# Patient Record
Sex: FEMALE | Race: WHITE
[De-identification: names, ages, dates, MRNs, and addresses within clinical notes are randomized per-mention and may not be internally consistent; named-entity substitution may affect disease eponyms.]

---

## 2018-05-21 ENCOUNTER — HOSPITAL ENCOUNTER (OUTPATIENT)
Dept: HOSPITAL 76 - LAB.N | Age: 68
End: 2018-05-21
Attending: PHYSICIAN ASSISTANT
Payer: MEDICARE

## 2018-05-21 DIAGNOSIS — E11.9: Primary | ICD-10-CM

## 2018-05-21 LAB
EST. AVERAGE GLUCOSE BLD GHB EST-MCNC: 163 MG/DL (ref 70–100)
HB2 TOTAL: 12.9 G/DL
HBA1C BLD-MCNC: 0.72 G/DL
HEMOGLOBIN A1C %: 7.3 % (ref 4.6–6.2)

## 2018-05-21 PROCEDURE — 36415 COLL VENOUS BLD VENIPUNCTURE: CPT

## 2018-05-21 PROCEDURE — 83036 HEMOGLOBIN GLYCOSYLATED A1C: CPT

## 2018-06-27 ENCOUNTER — HOSPITAL ENCOUNTER (OUTPATIENT)
Dept: HOSPITAL 76 - RT.N | Age: 68
End: 2018-06-27
Attending: FAMILY MEDICINE
Payer: MEDICARE

## 2018-06-27 DIAGNOSIS — R42: Primary | ICD-10-CM

## 2018-06-27 PROCEDURE — 93005 ELECTROCARDIOGRAM TRACING: CPT

## 2018-06-28 ENCOUNTER — HOSPITAL ENCOUNTER (OUTPATIENT)
Dept: HOSPITAL 76 - LAB.N | Age: 68
Discharge: HOME | End: 2018-06-28
Attending: FAMILY MEDICINE
Payer: MEDICARE

## 2018-06-28 DIAGNOSIS — R42: ICD-10-CM

## 2018-06-28 DIAGNOSIS — D64.9: Primary | ICD-10-CM

## 2018-06-28 DIAGNOSIS — R94.31: ICD-10-CM

## 2018-06-28 LAB
ALBUMIN DIAFP-MCNC: 3.7 G/DL (ref 3.2–5.5)
ALBUMIN/GLOB SERPL: 0.9 {RATIO} (ref 1–2.2)
ALP SERPL-CCNC: 45 IU/L (ref 42–121)
ALT SERPL W P-5'-P-CCNC: 29 IU/L (ref 10–60)
ANION GAP SERPL CALCULATED.4IONS-SCNC: 9 MMOL/L (ref 6–13)
AST SERPL W P-5'-P-CCNC: 32 IU/L (ref 10–42)
BASOPHILS NFR BLD AUTO: 0 10^3/UL (ref 0–0.1)
BASOPHILS NFR BLD AUTO: 0.8 %
BILIRUB BLD-MCNC: 0.5 MG/DL (ref 0.2–1)
BUN SERPL-MCNC: 26 MG/DL (ref 6–20)
CALCIUM UR-MCNC: 9.3 MG/DL (ref 8.5–10.3)
CHLORIDE SERPL-SCNC: 106 MMOL/L (ref 101–111)
CO2 SERPL-SCNC: 22 MMOL/L (ref 21–32)
CREAT SERPLBLD-SCNC: 0.7 MG/DL (ref 0.4–1)
EOSINOPHIL # BLD AUTO: 0.1 10^3/UL (ref 0–0.7)
EOSINOPHIL NFR BLD AUTO: 2.3 %
ERYTHROCYTE [DISTWIDTH] IN BLOOD BY AUTOMATED COUNT: 15.2 % (ref 12–15)
FERRITIN SERPL-MCNC: 6.3 NG/ML (ref 11–306.8)
FOLATE SERPL-MCNC: > 49.6 NG/ML
GFRSERPLBLD MDRD-ARVRAT: 83 ML/MIN/{1.73_M2} (ref 89–?)
GLOBULIN SER-MCNC: 3.9 G/DL (ref 2.1–4.2)
GLUCOSE SERPL-MCNC: 189 MG/DL (ref 70–100)
HGB UR QL STRIP: 12.1 G/DL (ref 12–16)
IRON SATN MFR SERPL: 13 % (ref 20–50)
IRON SERPL-MCNC: 56 UG/DL (ref 28–170)
LYMPHOCYTES # SPEC AUTO: 2.1 10^3/UL (ref 1.5–3.5)
LYMPHOCYTES NFR BLD AUTO: 43.1 %
MCH RBC QN AUTO: 29 PG (ref 27–31)
MCHC RBC AUTO-ENTMCNC: 34.4 G/DL (ref 32–36)
MCV RBC AUTO: 84.3 FL (ref 81–99)
MEAN RETIC VALUE: 108.8
MONOCYTES # BLD AUTO: 0.3 10^3/UL (ref 0–1)
MONOCYTES NFR BLD AUTO: 5.7 %
NEUTROPHILS # BLD AUTO: 2.3 10^3/UL (ref 1.5–6.6)
NEUTROPHILS # SNV AUTO: 4.8 X10^3/UL (ref 4.8–10.8)
NEUTROPHILS NFR BLD AUTO: 48.1 %
PDW BLD AUTO: 7.7 FL (ref 7.9–10.8)
PLATELET # BLD: 230 10^3/UL (ref 130–450)
PROT SPEC-MCNC: 7.6 G/DL (ref 6.7–8.2)
RBC MAR: 4.18 10^6/UL (ref 4.2–5.4)
SODIUM SERPLBLD-SCNC: 137 MMOL/L (ref 135–145)
TIBC SERPL-MCNC: 426 UG/DL (ref 250–450)
TRANSFERRIN SERPL-MCNC: 304 MG/DL (ref 192–382)
VIT B12 SERPL-MCNC: 518 PG/ML (ref 180–914)

## 2018-06-28 PROCEDURE — 82746 ASSAY OF FOLIC ACID SERUM: CPT

## 2018-06-28 PROCEDURE — 82607 VITAMIN B-12: CPT

## 2018-06-28 PROCEDURE — 80053 COMPREHEN METABOLIC PANEL: CPT

## 2018-06-28 PROCEDURE — 85025 COMPLETE CBC W/AUTO DIFF WBC: CPT

## 2018-06-28 PROCEDURE — 82728 ASSAY OF FERRITIN: CPT

## 2018-06-28 PROCEDURE — 36415 COLL VENOUS BLD VENIPUNCTURE: CPT

## 2018-06-28 PROCEDURE — 85044 MANUAL RETICULOCYTE COUNT: CPT

## 2018-06-28 PROCEDURE — 84466 ASSAY OF TRANSFERRIN: CPT

## 2018-06-28 PROCEDURE — 83540 ASSAY OF IRON: CPT

## 2018-08-22 ENCOUNTER — HOSPITAL ENCOUNTER (OUTPATIENT)
Dept: HOSPITAL 76 - DI.N | Age: 68
Discharge: HOME | End: 2018-08-22
Attending: PHYSICIAN ASSISTANT
Payer: MEDICARE

## 2018-08-22 DIAGNOSIS — Z12.31: Primary | ICD-10-CM

## 2018-08-22 PROCEDURE — 77067 SCR MAMMO BI INCL CAD: CPT

## 2018-09-07 ENCOUNTER — HOSPITAL ENCOUNTER (OUTPATIENT)
Dept: HOSPITAL 76 - LAB.N | Age: 68
Discharge: HOME | End: 2018-09-07
Attending: FAMILY MEDICINE
Payer: MEDICARE

## 2018-09-07 DIAGNOSIS — E11.9: Primary | ICD-10-CM

## 2018-09-07 DIAGNOSIS — D64.9: ICD-10-CM

## 2018-09-07 LAB
ANION GAP SERPL CALCULATED.4IONS-SCNC: 10 MMOL/L (ref 6–13)
BASOPHILS NFR BLD AUTO: 0 10^3/UL (ref 0–0.1)
BASOPHILS NFR BLD AUTO: 0.7 %
BUN SERPL-MCNC: 16 MG/DL (ref 6–20)
CALCIUM UR-MCNC: 9.2 MG/DL (ref 8.5–10.3)
CHLORIDE SERPL-SCNC: 104 MMOL/L (ref 101–111)
CO2 SERPL-SCNC: 25 MMOL/L (ref 21–32)
CREAT SERPLBLD-SCNC: 0.5 MG/DL (ref 0.4–1)
EOSINOPHIL # BLD AUTO: 0.2 10^3/UL (ref 0–0.7)
EOSINOPHIL NFR BLD AUTO: 3.8 %
ERYTHROCYTE [DISTWIDTH] IN BLOOD BY AUTOMATED COUNT: 15.3 % (ref 12–15)
EST. AVERAGE GLUCOSE BLD GHB EST-MCNC: 143 MG/DL (ref 70–100)
GFRSERPLBLD MDRD-ARVRAT: 123 ML/MIN/{1.73_M2} (ref 89–?)
GLUCOSE SERPL-MCNC: 107 MG/DL (ref 70–100)
HB2 TOTAL: 13.1 G/DL
HBA1C BLD-MCNC: 0.64 G/DL
HEMOGLOBIN A1C %: 6.6 % (ref 4.6–6.2)
HGB UR QL STRIP: 12.4 G/DL (ref 12–16)
IRON SATN MFR SERPL: 12 % (ref 20–50)
IRON SERPL-MCNC: 50 UG/DL (ref 28–170)
LYMPHOCYTES # SPEC AUTO: 2.2 10^3/UL (ref 1.5–3.5)
LYMPHOCYTES NFR BLD AUTO: 43.7 %
MCH RBC QN AUTO: 28.5 PG (ref 27–31)
MCHC RBC AUTO-ENTMCNC: 34 G/DL (ref 32–36)
MCV RBC AUTO: 84.1 FL (ref 81–99)
MONOCYTES # BLD AUTO: 0.3 10^3/UL (ref 0–1)
MONOCYTES NFR BLD AUTO: 5.3 %
NEUTROPHILS # BLD AUTO: 2.3 10^3/UL (ref 1.5–6.6)
NEUTROPHILS # SNV AUTO: 5 X10^3/UL (ref 4.8–10.8)
NEUTROPHILS NFR BLD AUTO: 46.5 %
PDW BLD AUTO: 7.5 FL (ref 7.9–10.8)
PLATELET # BLD: 231 10^3/UL (ref 130–450)
RBC MAR: 4.35 10^6/UL (ref 4.2–5.4)
SODIUM SERPLBLD-SCNC: 139 MMOL/L (ref 135–145)
TIBC SERPL-MCNC: 417 UG/DL (ref 250–450)
TRANSFERRIN SERPL-MCNC: 298 MG/DL (ref 192–382)

## 2018-09-07 PROCEDURE — 80048 BASIC METABOLIC PNL TOTAL CA: CPT

## 2018-09-07 PROCEDURE — 84466 ASSAY OF TRANSFERRIN: CPT

## 2018-09-07 PROCEDURE — 36415 COLL VENOUS BLD VENIPUNCTURE: CPT

## 2018-09-07 PROCEDURE — 83036 HEMOGLOBIN GLYCOSYLATED A1C: CPT

## 2018-09-07 PROCEDURE — 83540 ASSAY OF IRON: CPT

## 2018-09-07 PROCEDURE — 85025 COMPLETE CBC W/AUTO DIFF WBC: CPT

## 2018-09-07 PROCEDURE — 82728 ASSAY OF FERRITIN: CPT

## 2018-09-24 ENCOUNTER — HOSPITAL ENCOUNTER (OUTPATIENT)
Dept: HOSPITAL 76 - DI | Age: 68
Discharge: HOME | End: 2018-09-24
Attending: PHYSICIAN ASSISTANT
Payer: MEDICARE

## 2018-09-24 DIAGNOSIS — R92.8: Primary | ICD-10-CM

## 2018-09-24 DIAGNOSIS — R92.0: ICD-10-CM

## 2018-09-24 NOTE — MAMMOGRAPHY REPORT
Reason:  ABN MAMMO - LT SPEC VIEWS

Procedure Date:  09/24/2018   

Accession Number:  [id number] / [id number]                    

Procedure:  FELICIA - Diag Special Views Dig LT CPT Code:  

 

FULL RESULT:

 

 

EXAM: Diag Special Views Dig LT

 

DATE: 9/24/2018 3:54 PM

 

CLINICAL HISTORY: 67-year-old female recalled from screening mammogram 

for left breast finding.

 

TECHNIQUE: Left spot CC and MLO views were obtained.

 

COMPARISON: 8/22/2018.

 

FINDINGS:

The breasts demonstrate scattered fibroglandular densities bilaterally. 

The 5 mm partially well-circumscribed hyperdense nodule persists and 

demonstrates microcalcifications in the left upper outer breast.

 

IMPRESSION: Incomplete

 

RECOMMENDATION: The patient has known prior imaging dating back 4 years 

which could establish absence or presence of stability of the lesion. 

This study should be interpreted in the context of these prior 

mammograms. If the studies cannot be made available, final interpretation 

of this study in isolation would require a recommendation for biopsy.

 

BIRADS CATEGORY 0 incomplete.

 

STANDARD QUALIFYING STATEMENTS:

1.  This examination was not reviewed with the aid of Computer-Aided 

Detection (CAD).

2.  A negative or benign  imaging report should not delay biopsy if 

clinically suspicious findings are present.  Consider surgical 

consultation if warrented.  More than 5% of cancers are not identified by 

imaging.

3.  Dense breasts may obscure an underlying neoplasm.

## 2019-06-03 ENCOUNTER — HOSPITAL ENCOUNTER (OUTPATIENT)
Dept: HOSPITAL 76 - DI | Age: 69
Discharge: HOME | End: 2019-06-03
Attending: INTERNAL MEDICINE
Payer: MEDICARE

## 2019-06-03 DIAGNOSIS — R39.9: Primary | ICD-10-CM

## 2019-06-03 PROCEDURE — 76857 US EXAM PELVIC LIMITED: CPT

## 2019-06-03 NOTE — ULTRASOUND REPORT
Reason:  UNSP SYMPTOMS AND SIGNS INVOLVING THE GENITOURINAR

Procedure Date:  06/03/2019   

Accession Number:  [id number] / [id number]                    

Procedure:  US  - Bladder CPT Code:  

 

FULL RESULT:

 

 

EXAM:

PELVIS ULTRASOUND, LIMITED

 

EXAM DATE: 6/3/2019 02:51 PM.

 

CLINICAL HISTORY: Frequent urination. Feeling of inability to empty the 

bladder.

 

COMPARISON: None.

 

TECHNIQUE: Real-time scanning was performed with static images obtained.

 

FINDINGS: Prevoid and postvoid images of the urinary bladder obtained. 

The prevoid bladder demonstrates a volume of 191 cc with no gross mass 

seen. Both ureteral jets are visualized. The postvoid bladder has a 

volume of 11.4 cc.

 

IMPRESSION: Normal bladder with normal postvoid residual.

 

RADIA

## 2020-02-14 ENCOUNTER — HOSPITAL ENCOUNTER (EMERGENCY)
Dept: HOSPITAL 76 - ED | Age: 70
Discharge: HOME | End: 2020-02-14
Payer: MEDICARE

## 2020-02-14 VITALS — SYSTOLIC BLOOD PRESSURE: 130 MMHG | DIASTOLIC BLOOD PRESSURE: 76 MMHG

## 2020-02-14 DIAGNOSIS — A08.4: Primary | ICD-10-CM

## 2020-02-14 LAB
ALBUMIN DIAFP-MCNC: 3.9 G/DL (ref 3.2–5.5)
ALBUMIN/GLOB SERPL: 1 {RATIO} (ref 1–2.2)
ALP SERPL-CCNC: 46 IU/L (ref 42–121)
ALT SERPL W P-5'-P-CCNC: 22 IU/L (ref 10–60)
ANION GAP SERPL CALCULATED.4IONS-SCNC: 15 MMOL/L (ref 6–13)
AST SERPL W P-5'-P-CCNC: 28 IU/L (ref 10–42)
BASOPHILS # BLD MANUAL: 0 10^3/UL (ref 0–0.1)
BASOPHILS NFR BLD AUTO: 1 %
BILIRUB BLD-MCNC: 0.8 MG/DL (ref 0.2–1)
BILIRUB UR QL CFM: NEGATIVE
BUN SERPL-MCNC: 31 MG/DL (ref 6–20)
CALCIUM UR-MCNC: 8.7 MG/DL (ref 8.5–10.3)
CASTS URNS QL MICRO: (no result) /LPF
CHLORIDE SERPL-SCNC: 98 MMOL/L (ref 101–111)
CLARITY UR REFRACT.AUTO: CLEAR
CO2 SERPL-SCNC: 20 MMOL/L (ref 21–32)
CREAT SERPLBLD-SCNC: 1.3 MG/DL (ref 0.4–1)
EOSINOPHIL # BLD MANUAL: 0 10^3/UL (ref 0–0.7)
EOSINOPHIL NFR BLD AUTO: 0.5 %
ERYTHROCYTE [DISTWIDTH] IN BLOOD BY AUTOMATED COUNT: 13.7 % (ref 12–15)
GFRSERPLBLD MDRD-ARVRAT: 41 ML/MIN/{1.73_M2} (ref 89–?)
GLOBULIN SER-MCNC: 3.9 G/DL (ref 2.1–4.2)
GLUCOSE SERPL-MCNC: 180 MG/DL (ref 70–100)
GLUCOSE UR QL STRIP.AUTO: 500 MG/DL
HGB UR QL STRIP: 14.7 G/DL (ref 12–16)
KETONES UR QL STRIP.AUTO: NEGATIVE MG/DL
LIPASE SERPL-CCNC: 37 U/L (ref 22–51)
LYMPH ABN NFR BLD MANUAL: 0 %
LYMPHOBLASTS # BLD: 5 %
LYMPHOCYTES # BLD MANUAL: 0.4 10^3/UL (ref 1.5–3.5)
LYMPHOCYTES NFR BLD AUTO: 16.2 %
MANUAL DIF COMMENT BLD-IMP: (no result)
MCH RBC QN AUTO: 29.3 PG (ref 27–31)
MCHC RBC AUTO-ENTMCNC: 33.2 G/DL (ref 32–36)
MCV RBC AUTO: 88.4 FL (ref 81–99)
MONOCYTES # BLD MANUAL: 0.2 10^3/UL (ref 0–1)
MONOCYTES NFR BLD AUTO: 4.7 %
NEUTROPHILS # SNV AUTO: 4 X10^3/UL (ref 4.8–10.8)
NEUTROPHILS NFR BLD AUTO: 76.6 %
NEUTS BAND NFR BLD: 65 %
NITRITE UR QL STRIP.AUTO: NEGATIVE
PDW BLD AUTO: 9.3 FL (ref 7.9–10.8)
PH UR STRIP.AUTO: 5.5 PH (ref 5–7.5)
PLAT MORPH BLD: (no result)
PLATELET # BLD: 211 10^3/UL (ref 130–450)
PLATELET BLD QL SMEAR: (no result)
PROT SPEC-MCNC: 7.8 G/DL (ref 6.7–8.2)
PROT UR STRIP.AUTO-MCNC: 30 MG/DL
RBC # UR STRIP.AUTO: NEGATIVE /UL
RBC # URNS HPF: (no result) /HPF (ref 0–5)
RBC MAR: 5.01 10^6/UL (ref 4.2–5.4)
RBC MORPH BLD: (no result)
SODIUM SERPLBLD-SCNC: 133 MMOL/L (ref 135–145)
SP GR UR STRIP.AUTO: 1.02 (ref 1–1.03)
SQUAMOUS URNS QL MICRO: (no result)
UROBILINOGEN UR QL STRIP.AUTO: (no result) E.U./DL
UROBILINOGEN UR STRIP.AUTO-MCNC: NEGATIVE MG/DL

## 2020-02-14 PROCEDURE — 96361 HYDRATE IV INFUSION ADD-ON: CPT

## 2020-02-14 PROCEDURE — 96374 THER/PROPH/DIAG INJ IV PUSH: CPT

## 2020-02-14 PROCEDURE — 36415 COLL VENOUS BLD VENIPUNCTURE: CPT

## 2020-02-14 PROCEDURE — 81003 URINALYSIS AUTO W/O SCOPE: CPT

## 2020-02-14 PROCEDURE — 80053 COMPREHEN METABOLIC PANEL: CPT

## 2020-02-14 PROCEDURE — 99284 EMERGENCY DEPT VISIT MOD MDM: CPT

## 2020-02-14 PROCEDURE — 74177 CT ABD & PELVIS W/CONTRAST: CPT

## 2020-02-14 PROCEDURE — 87086 URINE CULTURE/COLONY COUNT: CPT

## 2020-02-14 PROCEDURE — 85025 COMPLETE CBC W/AUTO DIFF WBC: CPT

## 2020-02-14 PROCEDURE — 83690 ASSAY OF LIPASE: CPT

## 2020-02-14 PROCEDURE — 81001 URINALYSIS AUTO W/SCOPE: CPT

## 2020-02-14 NOTE — CT REPORT
Reason:  LLQ abd pain, diarrhea

Procedure Date:  02/14/2020   

Accession Number:  [id number] / [id number]                    

Procedure:  CT  - Abdomen/Pelvis W CPT Code:  

 

***Final Report***

 

 

FULL RESULT:

 

 

EXAM:

CT ABDOMEN AND PELVIS

 

EXAM DATE: 2/14/2020 05:22 PM.

 

CLINICAL HISTORY: LLQ abd pain, diarrhea.

 

COMPARISONS: None.

 

TECHNIQUE: Routine helical CT imaging was performed through the abdomen 

and pelvis. IV contrast: 90 cc Optiray 320 IV. Enteric contrast: No. 

Reconstructions: Coronal and sagittal.

 

In accordance with CT protocol optimization, one or more of the following 

dose reduction techniques were utilized for this exam: automated exposure 

control, adjustment of mA and/or KV based on patient size, or use of 

iterative reconstructive technique.

 

FINDINGS:

Lung Bases: Unremarkable.

 

Liver: Normal. No masses.

 

Gallbladder/Bile Ducts: Unremarkable.

 

Spleen: Normal.

 

Pancreas: Normal.

 

Adrenal Glands: Normal.

 

Kidneys: Normal. No masses or hydronephrosis.

 

Peritoneal Cavity/Bowel: Stomach and duodenum appear within normal 

limits. The small bowel is normal in caliber and thickness. The 

transverse colon is decompressed. There moderate to marked number of 

diverticula within the left colon. No definite acute diverticulitis.

 

Pelvic Organs: Urinary bladder is unremarkable. Uterus is absent.

 

Vasculature: There are moderate arterial vascular calcifications without 

aneurysm.

 

Bones: There is degenerative disk disease with moderate disk height loss 

and disk osteophyte spurring at L4-L5.

 

Other: None.

IMPRESSION:

1. Moderate to marked colonic diverticulosis without definite acute 

diverticulitis or colitis.

2. No other acute abnormality.

 

RADIA

## 2020-02-14 NOTE — ED PHYSICIAN DOCUMENTATION
PD HPI NVD





- Stated complaint


Stated Complaint: N/V/D





- Chief complaint


Chief Complaint: Abd Pain





- History obtained from


History obtained from: Patient





- History of Present Illness


Timing - onset: How many days ago (2)


Timing - duration: Days (2)


Timing - details: Gradual onset


Pain level max: 3


Pain level now: 3


Associated symptoms: Abdominal pain


Contributing factors: No: Sick contact, Bad food, Travel, Recent antibiotics, 

Alcohol use, Anticoagulated, Diabetes


Improved by: Vomiting (Vomiting 2 days ago and yesterday, none today), BM (Has 

had diarrhea for the last 3 days.)


Worsened by: Other (Nothing)


Similar symptoms before: Has not had sx before


Recently seen: Not recently seen





Review of Systems


Ten Systems: 10 systems reviewed and negative


Constitutional: denies: Fever, Chills


Nose: denies: Rhinorrhea / runny nose, Congestion


Respiratory: denies: Cough


GI: reports: Vomiting, Diarrhea


: denies: Dysuria


Skin: denies: Rash


Musculoskeletal: denies: Neck pain, Back pain


Neurologic: denies: Headache





PD PAST MEDICAL HISTORY





- Past Medical History


Past Medical History: No





- Allergies


Allergies/Adverse Reactions: 


                                    Allergies











Allergy/AdvReac Type Severity Reaction Status Date / Time


 


No Known Drug Allergies Allergy   Verified 02/14/20 15:54














PD ED PE NORMAL





- Vitals


Vital signs reviewed: Yes





- General


General: Alert and oriented X 3, No acute distress





- HEENT


HEENT: Moist mucous membranes





- Neck


Neck: Supple, no meningeal sign





- Cardiac


Cardiac: RRR





- Respiratory


Respiratory: No respiratory distress, Clear bilaterally





- Abdomen


Abdomen: Soft, Non distended, Other (TTP LLQ, no peritoneal signs.)





- Derm


Derm: Warm and dry





- Neuro


Neuro: Alert and oriented X 3





Results





- Vitals


Vitals: 


                               Vital Signs - 24 hr











  02/14/20 02/14/20





  15:51 18:07


 


Temperature 36.9 C 37.1 C


 


Heart Rate 110 H 90


 


Respiratory 20 16





Rate  


 


Blood Pressure 95/60 130/76


 


O2 Saturation 98 98








                                     Oxygen











O2 Source                      Room air

















- Labs


Labs: 


                                Laboratory Tests











  02/14/20 02/14/20 02/14/20





  16:10 16:10 17:10


 


WBC  4.0 L  


 


RBC  5.01  


 


Hgb  14.7  


 


Hct  44.3  


 


MCV  88.4  


 


MCH  29.3  


 


MCHC  33.2  


 


RDW  13.7  


 


Plt Count  211  


 


MPV  9.3  


 


Neut # (Auto)  Not Reportable  


 


Lymph # (Auto)  Not Reportable  


 


Mono # (Auto)  Not Reportable  


 


Eos # (Auto)  Not Reportable  


 


Baso # (Auto)  Not Reportable  


 


Absolute Nucleated RBC  Not Reportable  


 


Total Counted  100  


 


Band Neuts % (Manual)  65 H  


 


Reactive Lymphs % (Man)  4  


 


Abnorm Lymph % (Manual)  0  


 


Nucleated RBC %  Not Reportable  


 


Neutrophils # (Manual)  3.4  


 


Lymphocytes # (Manual)  0.4 L  


 


Monocytes # (Manual)  0.2  


 


Eosinophils # (Manual)  0.0  


 


Basophils # (Manual)  0.0  


 


Differential Comment  MANUAL DIFFERENTIAL  


 


WBC Morphology  2+ VACUOLATION  


 


Platelet Estimate  NORMAL (130-450,000)  


 


Platelet Morphology  NORMAL APPEARANCE  


 


RBC Morph Micro Appear  NORMAL APPEARANCE  


 


Sodium   133 L 


 


Potassium   2.9 L 


 


Chloride   98 L 


 


Carbon Dioxide   20 L 


 


Anion Gap   15.0 H 


 


BUN   31 H 


 


Creatinine   1.3 H 


 


Estimated GFR (MDRD)   41 L 


 


Glucose   180 H 


 


Calcium   8.7 


 


Total Bilirubin   0.8 


 


AST   28 


 


ALT   22 


 


Alkaline Phosphatase   46 


 


Total Protein   7.8 


 


Albumin   3.9 


 


Globulin   3.9 


 


Albumin/Globulin Ratio   1.0 


 


Lipase   37 


 


Urine Color    YELLOW


 


Urine Clarity    CLEAR


 


Urine pH    5.5


 


Ur Specific Gravity    1.020


 


Urine Protein    30 H


 


Urine Glucose (UA)    500 H


 


Urine Ketones    NEGATIVE


 


Urine Occult Blood    NEGATIVE


 


Urine Nitrite    NEGATIVE


 


Urine Bilirubin    NEGATIVE


 


Urine Urobilinogen    0.2 (NORMAL)


 


Ur Leukocyte Esterase    NEGATIVE


 


Urine RBC    0-5


 


Urine WBC    0-3


 


Ur Squamous Epith Cells    RARE Squamous


 


Urine Bacteria    Rare


 


Urine Casts    6-10 Hyaline Casts


 


Ur Microscopic Review    INDICATED


 


Urine Culture Comments    NOT INDICATED














- Rads (name of study)


  ** abdomen/pelvis CT


Radiology: Prelim report reviewed, EMP read contemporaneously, See rad report 

(1. Moderate to marked colonic diverticulosis without definite acute 

diverticulitis or colitis. 2. No other acute abnormality. )





PD MEDICAL DECISION MAKING





- ED course


Complexity details: reviewed results, re-evaluated patient, considered 

differential, d/w patient


ED course: 





Patient feels better after IV fluids.  Tolerating p.o. without difficulty.  No 

significant findings on CT scan.  Patient counseled regarding signs and symptoms

for which I believe and urgent re-evaluation would be necessary. Patient with 

good understanding of and agreement to plan and is comfortable going home at 

this time





This document was made in part using voice recognition software. While efforts 

are made to proofread this document, sound alike and grammatical errors may 

occur.





Departure





- Departure


Disposition: 01 Home, Self Care


Clinical Impression: 


 Viral gastroenteritis





Condition: Good


Instructions:  ED Gastroenteritis Viral


Follow-Up: 


Doni Graham MD [Primary Care Provider] - Within 1 week


Comments: 


Follow-up with your doctor for further care.  Drink plenty of fluids.  The 

diarrhea should improve over the next 2 days.  Return if you worsen.  There are 

no acute findings on your CT scan


Discharge Date/Time: 02/14/20 18:08

## 2020-02-17 ENCOUNTER — HOSPITAL ENCOUNTER (EMERGENCY)
Dept: HOSPITAL 76 - ED | Age: 70
Discharge: HOME | End: 2020-02-17
Payer: MEDICARE

## 2020-02-17 VITALS — SYSTOLIC BLOOD PRESSURE: 170 MMHG | DIASTOLIC BLOOD PRESSURE: 85 MMHG

## 2020-02-17 DIAGNOSIS — E11.9: ICD-10-CM

## 2020-02-17 DIAGNOSIS — E87.6: ICD-10-CM

## 2020-02-17 DIAGNOSIS — K52.9: Primary | ICD-10-CM

## 2020-02-17 DIAGNOSIS — E86.0: ICD-10-CM

## 2020-02-17 LAB
ALBUMIN DIAFP-MCNC: 3.4 G/DL (ref 3.2–5.5)
ALBUMIN/GLOB SERPL: 0.8 {RATIO} (ref 1–2.2)
ALP SERPL-CCNC: 56 IU/L (ref 42–121)
ALT SERPL W P-5'-P-CCNC: 18 IU/L (ref 10–60)
ANION GAP SERPL CALCULATED.4IONS-SCNC: 17 MMOL/L (ref 6–13)
AST SERPL W P-5'-P-CCNC: 21 IU/L (ref 10–42)
BASOPHILS NFR BLD AUTO: 0 10^3/UL (ref 0–0.1)
BASOPHILS NFR BLD AUTO: 0.3 %
BILIRUB BLD-MCNC: 0.7 MG/DL (ref 0.2–1)
BUN SERPL-MCNC: 26 MG/DL (ref 6–20)
CALCIUM UR-MCNC: 9.6 MG/DL (ref 8.5–10.3)
CHLORIDE SERPL-SCNC: 101 MMOL/L (ref 101–111)
CLARITY UR REFRACT.AUTO: CLEAR
CO2 SERPL-SCNC: 18 MMOL/L (ref 21–32)
CREAT SERPLBLD-SCNC: 1 MG/DL (ref 0.4–1)
EOSINOPHIL # BLD AUTO: 0 10^3/UL (ref 0–0.7)
EOSINOPHIL NFR BLD AUTO: 0.3 %
ERYTHROCYTE [DISTWIDTH] IN BLOOD BY AUTOMATED COUNT: 13.4 % (ref 12–15)
GFRSERPLBLD MDRD-ARVRAT: 55 ML/MIN/{1.73_M2} (ref 89–?)
GLOBULIN SER-MCNC: 4.3 G/DL (ref 2.1–4.2)
GLUCOSE SERPL-MCNC: 192 MG/DL (ref 70–100)
GLUCOSE UR QL STRIP.AUTO: >=1000 MG/DL
HGB UR QL STRIP: 14.6 G/DL (ref 12–16)
KETONES UR QL STRIP.AUTO: (no result) MG/DL
LIPASE SERPL-CCNC: 29 U/L (ref 22–51)
LYMPHOCYTES # SPEC AUTO: 0.8 10^3/UL (ref 1.5–3.5)
LYMPHOCYTES NFR BLD AUTO: 12.9 %
MCH RBC QN AUTO: 30.5 PG (ref 27–31)
MCHC RBC AUTO-ENTMCNC: 35 G/DL (ref 32–36)
MCV RBC AUTO: 87.2 FL (ref 81–99)
MONOCYTES # BLD AUTO: 0.7 10^3/UL (ref 0–1)
MONOCYTES NFR BLD AUTO: 11.1 %
NEUTROPHILS # BLD AUTO: 4.5 10^3/UL (ref 1.5–6.6)
NEUTROPHILS # SNV AUTO: 6.2 X10^3/UL (ref 4.8–10.8)
NEUTROPHILS NFR BLD AUTO: 72.3 %
NITRITE UR QL STRIP.AUTO: POSITIVE
PDW BLD AUTO: 9.3 FL (ref 7.9–10.8)
PH UR STRIP.AUTO: 6 PH (ref 5–7.5)
PLATELET # BLD: 245 10^3/UL (ref 130–450)
PROT SPEC-MCNC: 7.7 G/DL (ref 6.7–8.2)
PROT UR STRIP.AUTO-MCNC: 30 MG/DL
RBC # UR STRIP.AUTO: (no result) /UL
RBC # URNS HPF: (no result) /HPF (ref 0–5)
RBC MAR: 4.78 10^6/UL (ref 4.2–5.4)
RBC MORPH BLD: (no result)
SODIUM SERPLBLD-SCNC: 136 MMOL/L (ref 135–145)
SP GR UR STRIP.AUTO: 1.02 (ref 1–1.03)
SQUAMOUS URNS QL MICRO: (no result)
UROBILINOGEN UR QL STRIP.AUTO: (no result) E.U./DL
UROBILINOGEN UR STRIP.AUTO-MCNC: NEGATIVE MG/DL

## 2020-02-17 PROCEDURE — 81001 URINALYSIS AUTO W/SCOPE: CPT

## 2020-02-17 PROCEDURE — 96366 THER/PROPH/DIAG IV INF ADDON: CPT

## 2020-02-17 PROCEDURE — 87045 FECES CULTURE AEROBIC BACT: CPT

## 2020-02-17 PROCEDURE — 87086 URINE CULTURE/COLONY COUNT: CPT

## 2020-02-17 PROCEDURE — 96365 THER/PROPH/DIAG IV INF INIT: CPT

## 2020-02-17 PROCEDURE — 85025 COMPLETE CBC W/AUTO DIFF WBC: CPT

## 2020-02-17 PROCEDURE — 83690 ASSAY OF LIPASE: CPT

## 2020-02-17 PROCEDURE — 81003 URINALYSIS AUTO W/O SCOPE: CPT

## 2020-02-17 PROCEDURE — 87046 STOOL CULTR AEROBIC BACT EA: CPT

## 2020-02-17 PROCEDURE — 96375 TX/PRO/DX INJ NEW DRUG ADDON: CPT

## 2020-02-17 PROCEDURE — 99284 EMERGENCY DEPT VISIT MOD MDM: CPT

## 2020-02-17 PROCEDURE — 36415 COLL VENOUS BLD VENIPUNCTURE: CPT

## 2020-02-17 PROCEDURE — 80053 COMPREHEN METABOLIC PANEL: CPT

## 2020-02-17 RX ADMIN — POTASSIUM CHLORIDE SCH MLS/HR: 7.46 INJECTION, SOLUTION INTRAVENOUS at 09:11

## 2020-02-17 RX ADMIN — POTASSIUM CHLORIDE SCH MLS/HR: 7.46 INJECTION, SOLUTION INTRAVENOUS at 10:15

## 2020-02-17 RX ADMIN — POTASSIUM CHLORIDE SCH MLS/HR: 7.46 INJECTION, SOLUTION INTRAVENOUS at 11:26

## 2020-02-17 NOTE — ED PHYSICIAN DOCUMENTATION
PD HPI NVD





- Stated complaint


Stated Complaint: ABD PX/VOMITING





- Chief complaint


Chief Complaint: Abd Pain





- History obtained from


History obtained from: Patient, Family





- History of Present Illness


Timing - onset: How many days ago (5)


Timing - duration: Days (5)


Timing - details: Abrupt onset, Still present


Associated symptoms: Abdominal pain, Dizzy, Loss of appetite


Contributing factors: Bad food


Improved by: Vomiting


Worsened by: Eating


Similar symptoms before: Diagnosis (gastroparesis)


Recently seen: Emergency Dept





- Additonal information


Additional information: 





69-year-old female ate some swordfish 5 days ago and when she developed 

abdominal pain and diarrhea she eventually came to the emergency department. She

has had persistent diarrhea and she is dehydrated. She has had vomiting as well.

Her  is not sick. 





Review of Systems


Constitutional: reports: Fever, Chills, Myalgias, Fatigue, Sweats


Eyes: denies: Decreased vision


Ears: denies: Ear pain


Nose: denies: Rhinorrhea / runny nose, Congestion


Throat: denies: Sore throat


Cardiac: denies: Chest pain / pressure


Respiratory: denies: Dyspnea, Cough


GI: reports: Abdominal Pain, Nausea, Vomiting, Diarrhea


: denies: Dysuria, Frequency





PD PAST MEDICAL HISTORY





- Past Medical History


Past Medical History: Yes


Endocrine/Autoimmune: Type 2 diabetes


GI: Other (Diabetic gastroparesis)





- Past Surgical History


Past Surgical History: No





- Present Medications


Home Medications: 


                                Ambulatory Orders











 Medication  Instructions  Recorded  Confirmed


 


Ciprofloxacin HCl [Cipro] 500 mg PO BID #14 tablet 02/17/20 


 


Ondansetron Odt [Zofran] 4 mg TL Q6H PRN #10 tablet 02/17/20 


 


Potassium Chloride 10 meq PO BID #10 tablet.er 02/17/20 














- Allergies


Allergies/Adverse Reactions: 


                                    Allergies











Allergy/AdvReac Type Severity Reaction Status Date / Time


 


No Known Drug Allergies Allergy   Verified 02/17/20 08:25














- Social History


Does the pt smoke?: No


Smoking Status: Never smoker


Does the pt drink ETOH?: No





- Immunizations


Immunizations are current?: Yes





PD ED PE NORMAL





- Vitals


Vital signs reviewed: Yes (hypertensive )





- General


General: Alert and oriented X 3, No acute distress, Well developed/nourished





- HEENT


HEENT: Atraumatic, PERRL, EOMI, Other (dry muocus membranes)





- Neck


Neck: Supple, no meningeal sign, No bony TTP





- Cardiac


Cardiac: RRR, No murmur





- Respiratory


Respiratory: No respiratory distress, Clear bilaterally





- Abdomen


Abdomen: Soft, Other (mild general tenderness without garding or rebound 

tenderness. )





- Back


Back: No CVA TTP, No spinal TTP





- Derm


Derm: Normal color, Warm and dry, No rash, Other (skin tents pretty good like 

she is pretty dehydrated)





- Extremities


Extremities: No deformity, No edema, No calf tenderness / cord





- Neuro


Neuro: Alert and oriented X 3, CNs 2-12 intact, No motor deficit, No sensory 

deficit, Normal speech


Eye Opening: Spontaneous


Motor: Obeys Commands


Verbal: Oriented


GCS Score: 15





- Psych


Psych: Normal mood, Normal affect





Results





- Vitals


Vitals: 


                               Vital Signs - 24 hr











  02/17/20 02/17/20 02/17/20





  08:21 09:42 12:56


 


Temperature 36.1 C L  


 


Heart Rate 96 102 H 101 H


 


Respiratory 20 24 27 H





Rate   


 


Blood Pressure 162/81 H 168/99 H 170/85 H


 


O2 Saturation 97 100 98








                                     Oxygen











O2 Source                      Room air

















- Labs


Labs: 


                                  Microbiology











 02/17/20 08:35 Campylobacter Antigen Assay - Final





 Stool 








                                Laboratory Tests











  02/17/20 02/17/20 02/17/20





  08:35 08:35 10:15


 


WBC  6.2  


 


RBC  4.78  


 


Hgb  14.6  


 


Hct  41.7  


 


MCV  87.2  


 


MCH  30.5  


 


MCHC  35.0  


 


RDW  13.4  


 


Plt Count  245  


 


MPV  9.3  


 


Neut # (Auto)  4.5  


 


Lymph # (Auto)  0.8 L  


 


Mono # (Auto)  0.7  


 


Eos # (Auto)  0.0  


 


Baso # (Auto)  0.0  


 


Absolute Nucleated RBC  0.00  


 


Nucleated RBC %  0.0  


 


Manual Slide Review  Indicated  


 


RBC Morph Micro Appear  1+ ANISOCYTOSIS  


 


Sodium   136 


 


Potassium   2.6 L 


 


Chloride   101 


 


Carbon Dioxide   18 L 


 


Anion Gap   17.0 H 


 


BUN   26 H 


 


Creatinine   1.0 


 


Estimated GFR (MDRD)   55 L 


 


Glucose   192 H 


 


Calcium   9.6 


 


Total Bilirubin   0.7 


 


AST   21 


 


ALT   18 


 


Alkaline Phosphatase   56 


 


Total Protein   7.7 


 


Albumin   3.4 


 


Globulin   4.3 H 


 


Albumin/Globulin Ratio   0.8 L 


 


Lipase   29 


 


Urine Color    YELLOW


 


Urine Clarity    CLEAR


 


Urine pH    6.0


 


Ur Specific Gravity    1.020


 


Urine Protein    30 H


 


Urine Glucose (UA)    >=1000 H


 


Urine Ketones    TRACE


 


Urine Occult Blood    SMALL H


 


Urine Nitrite    POSITIVE H


 


Urine Bilirubin    NEGATIVE


 


Urine Urobilinogen    0.2 (NORMAL)


 


Ur Leukocyte Esterase    NEGATIVE


 


Urine RBC    0-5


 


Urine WBC    0-3


 


Ur Squamous Epith Cells    RARE Squamous


 


Urine Bacteria    Rare


 


Ur Microscopic Review    INDICATED


 


Urine Culture Comments    INDICATED














Procedures





- IVC sono (time)


  ** 0894


Bedside IVC sono: IVC measures (cm) (1.01), IVC collapsed c insp (cm) 

(complete), Dehydration (est 1+ liter deficit)





PD MEDICAL DECISION MAKING





- ED course


Complexity details: reviewed old records, reviewed results, re-evaluated 

patient, considered differential, d/w patient, d/w family


ED course: 





69-year-old female with 5 days of diarrhea and vomiting after eating some 

swordfish is significantly dehydrated today and has low potassium.  She has a 

prior history of gastroparesis and I have evaluated her CT scan from her most 

recent visit I do not find that her stomach was distended in any way.  She is 

able to produce a stool specimen for us today and this is sent to the lab for 

processing.  The meantime she is administered saline and potassium. She has 

improvement and her campy antigen is negative. She is administered immodium and 

this helps as well. We will place her on a course of impiric antibiotic for 

diarrhea not resolved in 5 days. She required IV and oral potassium as well. 





Departure





- Departure


Disposition: 01 Home, Self Care


Clinical Impression: 


 Gastroenteritis, Hypokalemia due to excessive gastrointestinal loss of 

potassium





Condition: Stable


Instructions:  ED Gastroenteritis Bacterial, ED Potassium Deficiency


Follow-Up: 


Doni Graham MD [Primary Care Provider] - 


Prescriptions: 


Ciprofloxacin HCl [Cipro] 500 mg PO BID #14 tablet


Ondansetron Odt [Zofran] 4 mg TL Q6H PRN #10 tablet


 PRN Reason: Nausea / Vomiting


Potassium Chloride 10 meq PO BID #10 tablet.er


Discharge Date/Time: 02/17/20 13:03

## 2021-03-10 ENCOUNTER — HOSPITAL ENCOUNTER (OUTPATIENT)
Dept: HOSPITAL 76 - DI | Age: 71
Discharge: HOME | End: 2021-03-10
Attending: GENERAL ACUTE CARE HOSPITAL
Payer: MEDICARE

## 2021-03-10 DIAGNOSIS — Z12.31: Primary | ICD-10-CM

## 2021-03-11 NOTE — MAMMOGRAPHY REPORT
BILATERAL DIGITAL SCREENING MAMMOGRAM 3D/2D: 3/10/2021

 

CLINICAL: Routine screening.  

 

Comparison is made to exams dated:  8/22/2018 mammogram, 9/24/2018 mammogram - [facility], 6/16/2017 mammogram, 1/11/2016 mammogram, and 11/18/2014 mammogram - [facility]
r.  The tissue of both breasts is heterogeneously dense. This may lower the sensitivity of mammograph
y.  

 

There are benign calcifications in the left breast.  

No significant masses, calcifications, or other findings are seen in either breast.  

There has been no significant interval change.

 

IMPRESSION: BENIGN

There is no mammographic evidence of malignancy. A 1 year screening mammogram is recommended.  

 

 

This exam was interpreted at Station ID: [id number].  

 

NOTE: For mammograms, a report in lay terms will be sent to the patient. Approximately 15% of breast 
malignancies will not be visualized mammographically. In the management of a palpable breast mass, a 
negative mammogram must not discourage biopsy of a clinically suspicious lesion.

 

Electronically Signed By: Feng freedman/donovan:3/10/2021 15:28:24  

 

 

 

ACR BI-RADS Category 2: Benign Finding(s) 3342F

PARENCHYMAL PATTERN: (D) - The breast(s) demonstrate(s) heterogeneously dense fibroglandular fredy garcia.

BI-RADS CATEGORY: (2) - 2

RECOMMENDATION: (ANNUAL)  - Recommend routine annual screening mammography.

[id number]

1 year screening

LATERALITY: (B)

## 2022-06-28 ENCOUNTER — HOSPITAL ENCOUNTER (OUTPATIENT)
Dept: HOSPITAL 76 - DI | Age: 72
Discharge: HOME | End: 2022-06-28
Attending: STUDENT IN AN ORGANIZED HEALTH CARE EDUCATION/TRAINING PROGRAM
Payer: MEDICARE

## 2022-06-28 DIAGNOSIS — N95.8: ICD-10-CM

## 2022-06-28 DIAGNOSIS — Z78.0: Primary | ICD-10-CM

## 2022-06-28 NOTE — DEXA REPORT
PROCEDURE: Dexa Spine and/or Hip

 

INDICATIONS: POST MENOPAUSAL

 

TECHNIQUE: Dual energy x-ray absorptiometry (DXA) was performed on a [employer] System.  Regions measur
ed are the AP Spine, femoral neck, and if needed forearm.

 

COMPARISON: None.

  

FINDINGS:

 

Lumbar Spine: 

Bone Mineral Density   1.512 g/cm/cm,T score  2.6, normal

 

Left Hip:

Bone Mineral Density  1.118 g/cm/cm,T score 0.9, normal

 

Left Femoral Neck:

Bone Mineral Density  0.919 g/cm/cm, T score -0.9, normal

 

 

(T score greater or equal to -1.0: NORMAL)

(T score from -1.1 to -2.4: OSTEOPENIA)

(T score less than or equal to -2.5 to: OSTEOPOROSIS)

 

 

Impression: 

 

Normal bone mineral density.

 

 

Patients with diagnosis of osteoporosis or osteopenia should have regular bone mineral density assess
ment.  For those eligible for Medicare, routine testing is allowed once every 2 years.  Testing frequ
ency can be increased for patients who have rapidly progressing disease or for those who are receivin
g medical therapy to restore bone mass.

 

Reviewed by: Africa Tarango MD, PhD on 6/28/2022 1:42 PM PDT

Approved by: Africa Tarango MD, PhD on 6/28/2022 1:42 PM PDT

 

 

Station ID:  [id number]

## 2023-07-31 ENCOUNTER — HOSPITAL ENCOUNTER (OUTPATIENT)
Dept: HOSPITAL 76 - DI | Age: 73
Discharge: HOME | End: 2023-07-31
Attending: INTERNAL MEDICINE
Payer: MEDICARE

## 2023-07-31 DIAGNOSIS — Z12.31: Primary | ICD-10-CM

## 2023-08-01 NOTE — MAMMOGRAPHY REPORT
BILATERAL DIGITAL SCREENING MAMMOGRAM 3D/2D: 7/31/2023

 

CLINICAL: Routine screening.  

 

Comparison is made to exams dated:  7/18/2022 mammogram, 3/10/2021 mammogram, 9/24/2018 mammogram, 8/
22/2018 mammogram - [facility], 1/11/2016 mammogram, and 6/16/2017 mammogram - [facility].  

 

Both breasts are heterogeneously dense, which may obscure small masses (category c / 51-75% glandular
 tissue).  

 

There are benign calcifications in the left breast.  

No significant masses, calcifications, or other findings are seen in either breast.  

There has been no significant interval change.

 

IMPRESSION: BENIGN

There is no mammographic evidence of malignancy. A 1 year screening mammogram is recommended.  

 

Based on the Tyrer Cuzick model (a risk assessment model) the patients lifetime risk is 9.0% and her
 10 year risk is 6.7%. According to the ACR, ACS, and NCCN guidelines, an annual breast MRI exam ann
g with mammogram is recommended if the patients lifetime risk is 20% or greater.

 

 

This exam was interpreted at Station ID: [id number].  

 

NOTE: For mammograms, a report in lay terms will be sent to the patient. Approximately 15% of breast 
malignancies will not be visualized mammographically. In the management of a palpable breast mass, a 
negative mammogram must not discourage biopsy of a clinically suspicious lesion.

 

Electronically Signed By: Vernon addison/donovan:7/31/2023 20:08:48  

 

 

letter sent: No_Letter  

ACR BI-RADS Category 2: Benign Finding(s) 3342F

PARENCHYMAL PATTERN: (D) - The breast(s) demonstrate(s) heterogeneously dense fibroglandular parisaacy
ma.

BI-RADS CATEGORY: (2) - 2

Mammogram

[id number]

1 year screening

LATERALITY: (B)